# Patient Record
Sex: FEMALE | Race: OTHER | NOT HISPANIC OR LATINO
[De-identification: names, ages, dates, MRNs, and addresses within clinical notes are randomized per-mention and may not be internally consistent; named-entity substitution may affect disease eponyms.]

---

## 2019-08-01 ENCOUNTER — TRANSCRIPTION ENCOUNTER (OUTPATIENT)
Age: 59
End: 2019-08-01

## 2019-09-04 ENCOUNTER — TRANSCRIPTION ENCOUNTER (OUTPATIENT)
Age: 59
End: 2019-09-04

## 2019-11-11 ENCOUNTER — TRANSCRIPTION ENCOUNTER (OUTPATIENT)
Age: 59
End: 2019-11-11

## 2019-11-25 ENCOUNTER — TRANSCRIPTION ENCOUNTER (OUTPATIENT)
Age: 59
End: 2019-11-25

## 2020-05-04 ENCOUNTER — TRANSCRIPTION ENCOUNTER (OUTPATIENT)
Age: 60
End: 2020-05-04

## 2020-05-16 ENCOUNTER — TRANSCRIPTION ENCOUNTER (OUTPATIENT)
Age: 60
End: 2020-05-16

## 2020-08-03 ENCOUNTER — TRANSCRIPTION ENCOUNTER (OUTPATIENT)
Age: 60
End: 2020-08-03

## 2020-10-12 ENCOUNTER — TRANSCRIPTION ENCOUNTER (OUTPATIENT)
Age: 60
End: 2020-10-12

## 2020-10-20 ENCOUNTER — TRANSCRIPTION ENCOUNTER (OUTPATIENT)
Age: 60
End: 2020-10-20

## 2020-11-27 ENCOUNTER — TRANSCRIPTION ENCOUNTER (OUTPATIENT)
Age: 60
End: 2020-11-27

## 2021-01-21 ENCOUNTER — EMERGENCY (EMERGENCY)
Facility: HOSPITAL | Age: 61
LOS: 1 days | Discharge: HOME | End: 2021-01-21
Admitting: EMERGENCY MEDICINE
Payer: COMMERCIAL

## 2021-01-21 VITALS
SYSTOLIC BLOOD PRESSURE: 139 MMHG | RESPIRATION RATE: 18 BRPM | DIASTOLIC BLOOD PRESSURE: 82 MMHG | HEART RATE: 84 BPM | OXYGEN SATURATION: 98 % | TEMPERATURE: 98 F

## 2021-01-21 DIAGNOSIS — Z20.822 CONTACT WITH AND (SUSPECTED) EXPOSURE TO COVID-19: ICD-10-CM

## 2021-01-21 LAB — SARS-COV-2 RNA SPEC QL NAA+PROBE: SIGNIFICANT CHANGE UP

## 2021-01-21 PROCEDURE — U0005: CPT

## 2021-01-21 PROCEDURE — 99283 EMERGENCY DEPT VISIT LOW MDM: CPT

## 2021-01-21 PROCEDURE — U0003: CPT

## 2021-01-21 NOTE — ED PROVIDER NOTE - PATIENT PORTAL LINK FT
You can access the FollowMyHealth Patient Portal offered by [facility] by registering at the following website: [email or web address]. By joining [employer]’s FollowMyHealth portal, you will also be able to view your health information using other applications (apps) compatible with our system.

## 2021-01-21 NOTE — ED ADULT TRIAGE NOTE - CHIEF COMPLAINT QUOTE
Requesting COVID swab.  Denies any fever, SOB, chest pain, cough, fatigue or any other symptom complaint.

## 2021-02-28 ENCOUNTER — TRANSCRIPTION ENCOUNTER (OUTPATIENT)
Age: 61
End: 2021-02-28

## 2021-05-22 ENCOUNTER — EMERGENCY (EMERGENCY)
Facility: HOSPITAL | Age: 61
LOS: 1 days | Discharge: HOME | End: 2021-05-22
Admitting: EMERGENCY MEDICINE
Payer: COMMERCIAL

## 2021-05-22 VITALS
WEIGHT: 139.99 LBS | RESPIRATION RATE: 16 BRPM | HEIGHT: 65 IN | OXYGEN SATURATION: 97 % | SYSTOLIC BLOOD PRESSURE: 124 MMHG | DIASTOLIC BLOOD PRESSURE: 81 MMHG | TEMPERATURE: 98 F | HEART RATE: 94 BPM

## 2021-05-22 DIAGNOSIS — S82.831A OTHER FRACTURE OF UPPER AND LOWER END OF RIGHT FIBULA, INITIAL ENCOUNTER FOR CLOSED FRACTURE: ICD-10-CM

## 2021-05-22 DIAGNOSIS — S82.401A UNSPECIFIED FRACTURE OF SHAFT OF RIGHT FIBULA, INITIAL ENCOUNTER FOR CLOSED FRACTURE: ICD-10-CM
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
## 2021-05-22 DIAGNOSIS — M25.571 PAIN IN RIGHT ANKLE AND JOINTS OF RIGHT FOOT: ICD-10-CM

## 2021-05-22 PROCEDURE — 73610 X-RAY EXAM OF ANKLE: CPT | Mod: 26,RT

## 2021-05-22 PROCEDURE — 29515 APPLICATION SHORT LEG SPLINT: CPT | Mod: RT

## 2021-05-22 PROCEDURE — 99284 EMERGENCY DEPT VISIT MOD MDM: CPT | Mod: 25

## 2021-05-22 RX ORDER — IBUPROFEN 200 MG
600 TABLET ORAL ONCE
Refills: 0 | Status: COMPLETED | OUTPATIENT
Start: 2021-05-22 | End: 2021-05-22

## 2021-05-22 RX ADMIN — Medication 600 MILLIGRAM(S): at 14:32

## 2021-05-22 NOTE — ED PROCEDURE NOTE - CPROC ED ANATOMIC LOCATION1
May 12, 2018      Cecilia Quispe MD  [address] [address]           [facility] - Podiatry/Wound Care  [address] [place] [address]  Phone: [phone]  Fax: [phone]          Patient: Vignesh Olson   MR Number: [id number]   YOB: 2007   Date of Visit: 5/9/2018       Dear Dr. Cecilia Quispe:    Thank you for referring Vignesh Olson to me for evaluation. Attached you will find relevant portions of my assessment and plan of care.    If you have questions, please do not hesitate to call me. I look forward to following Vignesh Olson along with you.    Sincerely,    Mario eBe, MARCUS    Enclosure  CC:  No Recipients    If you would like to receive this communication electronically, please contact [email or web address] or [phone] to request more information on [email or web address] Link access.    For providers and/or their staff who would like to refer a patient to [facility], please contact us through our one-stop-shop provider referral line, [facility], at [phone].    If you feel you have received this communication in error or would no longer like to receive these types of communications, please e-mail [email or web address]          fibula

## 2021-05-22 NOTE — ED PROVIDER NOTE - ADDITIONAL RISK FACTOR FREE TEXT BOX
XR reveals distal right fibular fracture neurovascular status intact. Ankle splinted without complication, and crutches were provided. Supportive care instructions and RICE were advised. Patient was told to follow up with orthopedist later this week for further evaluation.

## 2021-05-22 NOTE — ED PROVIDER NOTE - PATIENT PORTAL LINK FT
You can access the FollowMyHealth Patient Portal offered by [facility] by registering at the following website: [email or web address]. By joining [employer]’s FollowMyHealth portal, you will also be able to view your health information using other applications (apps) compatible with our system.

## 2021-05-22 NOTE — ED PROVIDER NOTE - CROS ED MUSC ALL NEG
[FreeTextEntry6] : cough mucous \par nasal congestion\par no fever\par no vomiting\par no diarrhea\par eating and drinking well\par urinating normally\par no sick at home\par goes  to \par no travel\par \par 
- - -

## 2021-05-22 NOTE — ED PROVIDER NOTE - CARE PROVIDER_API CALL
Rajinder Mendiola)  Orthopaedic Surgery  [address] [place] [address]  Phone: [phone]  Fax: [phone]  Follow Up Time: 1-3 Days

## 2021-05-22 NOTE — ED PROVIDER NOTE - MUSCULOSKELETAL, MLM
Moderate swelling and tenderness to palpation over right lateral malleolus with limited ROM 2/2 pain. Distal neurovascular status intact, no obvious deformity. Compartments are soft, no palpable crepitus.

## 2021-05-22 NOTE — ED PROVIDER NOTE - OBJECTIVE STATEMENT
59 y/o female present to the ED complaining of right ankle pain 2/2 accidentally twisting right ankle while walking down the street last night. Patient inverted her ankle causing a fall to the ground, but sustained no injuries other than the right ankle. Patient was able to stand up after fall, but had pain with ambulation causing her to limp. Pain is localized to the lateral aspect, and is worse with weight bearing. Patient took ibuprofen last night but had minimal relief, so she came here for further evaluation.     Denies lower extremity weakness, numbness, open wounds, bleeding, head strike, and neck and back pain

## 2021-05-22 NOTE — ED ADULT TRIAGE NOTE - CHIEF COMPLAINT QUOTE
Pt twisted her Rt ankle last night Since waking up this morning has been experiencing worsening pain. No obvious deformity. Pt experiencing difficulty bearing weight

## 2021-05-22 NOTE — ED PROVIDER NOTE - CHPI ED SYMPTOMS NEG
no open wounds, no neck pain, no back pain/no bleeding/no loss of consciousness/no numbness/no weakness

## 2021-05-22 NOTE — ED PROVIDER NOTE - NSFOLLOWUPINSTRUCTIONS_ED_ALL_ED_FT
Fracture    A fracture is a break in one of your bones. This can occur from a variety of injuries, especially traumatic ones. Symptoms include pain, bruising, or swelling. Do not use the injured limb. If a fracture is in one of the bones below your waist, do not put weight on that limb unless instructed to do so by your healthcare provider. Crutches or a cane may have been provided. A splint or cast may have been applied by your health care provider. Make sure to keep it dry and follow up with an orthopedist as instructed.    PLEASE FOLLOW UP WITH DR. BRITO (ORTHOPEDIST) IN 1-3 DAYS FOR FURTHER EVALUATION AND MANAGEMENT AS DISCUSSED.    RETURN TO THE ER IMMEDIATELY IF YOU HAVE ANY OF THE FOLLOWING SYMPTOMS: numbness, tingling, increasing pain, or weakness in any part of the injured limb.

## 2021-06-05 ENCOUNTER — TRANSCRIPTION ENCOUNTER (OUTPATIENT)
Age: 61
End: 2021-06-05

## 2021-08-22 ENCOUNTER — TRANSCRIPTION ENCOUNTER (OUTPATIENT)
Age: 61
End: 2021-08-22

## 2021-12-14 ENCOUNTER — TRANSCRIPTION ENCOUNTER (OUTPATIENT)
Age: 61
End: 2021-12-14

## 2022-03-14 ENCOUNTER — TRANSCRIPTION ENCOUNTER (OUTPATIENT)
Age: 62
End: 2022-03-14

## 2022-10-31 ENCOUNTER — NON-APPOINTMENT (OUTPATIENT)
Age: 62
End: 2022-10-31

## 2023-05-12 ENCOUNTER — NON-APPOINTMENT (OUTPATIENT)
Age: 63
End: 2023-05-12

## 2024-01-16 NOTE — ED PROVIDER NOTE - CHPI ED SYMPTOMS POS
"DOS: 2024  NAME: Antonette King   : 1935  PCP: Tonny Willis MD  Chief Complaint   Patient presents with    Neuro Deficit(s)     Stroke    Subjective: No acute neurologic events overnight- pt. W/ episodes of HTN (primary team managing. Patient denies any new complaints/concerns on my exam.    No family at bedside.   Very [employer]       Objective:  Vital signs: BP (!) 195/105 (BP Location: Left arm, Patient Position: Lying)   Pulse 76   Temp 97.9 °F (36.6 °C) (Oral)   Resp 18   Ht 162 cm (63.78\")   Wt 70 kg (154 lb 5.2 oz)   SpO2 98%   BMI 26.67 kg/m²       HEENT: Normocephalic, atraumatic   COR: RRR  Resp: Even and unlabored  Extremities: Equal pulses, nondistal embolization    Neurological:   MS: AO. Language normal. No neglect. Higher integrative function normal  CN: II-XII normal except [employer]   Motor: 5/5, normal tone   Reflexes: Toes downgoing  Sensory: Intact  Coordination: Normal    Laboratory results:  Lab Results   Component Value Date    GLUCOSE 94 01/15/2024    CALCIUM 9.2 01/15/2024     01/15/2024    K 3.7 01/15/2024    CO2 25.0 01/15/2024     01/15/2024    BUN 21 01/15/2024    CREATININE 1.56 (H) 01/15/2024    EGFRIFNONA 63 2021    BCR 13.5 01/15/2024    ANIONGAP 11.0 01/15/2024     Lab Results   Component Value Date    WBC 7.81 01/15/2024    HGB 9.0 (L) 01/15/2024    HCT 27.9 (L) 01/15/2024    MCV 87.2 01/15/2024     01/15/2024     Lab Results   Component Value Date    CHOL 204 (H) 2024    CHOL 221 (H) 2023     Lab Results   Component Value Date    HDL 58 2024    HDL 77 (H) 2023     Lab Results   Component Value Date     (H) 2024     (H) 2023     Lab Results   Component Value Date    TRIG 97 2024    TRIG 115 2023         Lab 24  0642   HEMOGLOBIN A1C 5.50      Review and interpretation of imaging:  Imaging discussed below reviewed independently.      Impression: This is a 88-year-old female " with past medical history of CAD status post CABG (2000)-advised dual oral antiplatelet prior to arrival although patient only on aspirin currently, hypertension, mixed hyperlipidemia who presented to [facility] with acute strokelike symptoms-sudden onset right-sided weakness versus left-sided weakness, facial droop and aphasia for which our service was consulted-symptoms resolved prior to arrival and symptoms have not returned.     Since admission, CT of the head showed chronic right and left lacunar infarcts in the basal ganglia and along with chronic left parietal lobe strokes.  MRI of the brain showed no evidence of acute infarct evidence of remote infarcts as noted above evidence of small vessel ischemic disease noted area of prominent signal loss in the left ICA felt to be most consistent with aneurysm approximately 1.4 cm therefore recommend MRA of the head with contrast to further characterize.      Neurologically, stable. MRA Head showed multiple aneurysms with most concerning left ICA-will consult neurosurgery to weigh in further recommendations.  Other recommendations below. No further neurology workup indicated. We will sign off and see again upon request.      Diagnosis:  1.  Concern for CVA versus TIA with acute onset aphasia/facial droop and unilateral weakness right versus left ?-Resolved-MRI of the brain reassuring  2.  Syncopal episode  3.  CAD status post  4.  Hypertension  5.  Abnormal MRI with multiple aneurysms most concerning left ICA aneurysm        Plan:  SHELIA consult   Aspirin 81mg daily-on 81 mg daily prior to arrival  Plavix 75 mg daily- on PTA   Lipitor 80 mg daily-on 40 mg daily prior to arrival-  Neurochecks  BP control SPB goal < 140; avoid hypertension   Stroke Education  CHRISTAL/SCDs  PT/OT/ST    Case reviewed with attending neurologist  01/16 and he agrees with treatment plan above.    S/o planned discussed with Dr. Romeo.       Nicolasa Anders,  APRN     PAIN

## 2024-09-18 ENCOUNTER — NON-APPOINTMENT (OUTPATIENT)
Age: 64
End: 2024-09-18

## 2025-09-09 ENCOUNTER — NON-APPOINTMENT (OUTPATIENT)
Age: 65
End: 2025-09-09